# Patient Record
Sex: MALE | Race: WHITE | NOT HISPANIC OR LATINO | URBAN - METROPOLITAN AREA
[De-identification: names, ages, dates, MRNs, and addresses within clinical notes are randomized per-mention and may not be internally consistent; named-entity substitution may affect disease eponyms.]

---

## 2021-04-02 NOTE — PATIENT DISCUSSION
New Prescription: moxifloxacin (moxifloxacin): drops: 0.5% 1 drop three times a day as directed into left eye 04-

## 2021-04-02 NOTE — PATIENT DISCUSSION
Stopped Today: Visine Dry Eye Relief (peg 400-hypromellose-glycerin): drops: 1-0.2-0.2% 1 drop as needed into both eyes

## 2021-04-02 NOTE — PATIENT DISCUSSION
New Prescription: Ilevro (nepafenac): drops,suspension: 0.3% 1 drop every morning as directed into left eye 04-

## 2021-04-02 NOTE — PATIENT DISCUSSION
New Prescription: prednisolone acetate (prednisolone acetate): drops,suspension: 1% 1 drop three times a day as directed into left eye 04-

## 2021-05-05 NOTE — PATIENT DISCUSSION
Continue: moxifloxacin (moxifloxacin): drops: 0.5% 1 drop three times a day as directed into both eyes 04-

## 2021-05-05 NOTE — PATIENT DISCUSSION
Discontinue in left eye 05/12/21. Start two days prior to surgery in right eye and continue as directed.

## 2021-05-05 NOTE — PATIENT DISCUSSION
Taper as as directed by comanaging optometrist in left eye. Start immediately after surgery in right eye and continue as directed.

## 2021-05-05 NOTE — PATIENT DISCUSSION
Continue in left eye as directed by comanaging optometrist. Start two days prior to surgery in right eye and continue as directed.

## 2021-05-05 NOTE — PATIENT DISCUSSION
Continue: prednisolone acetate (prednisolone acetate): drops,suspension: 1% 1 drop three times a day as directed into both eyes 04-

## 2021-05-05 NOTE — PATIENT DISCUSSION
Continue: Ilevro (nepafenac): drops,suspension: 0.3% 1 drop every morning as directed into both eyes 04-

## 2021-05-19 NOTE — PATIENT DISCUSSION
S/P PHACO W/IOL, OD:  ELEVATED IOP. INSTILLED ONE DROP OF ALPHAGAN @ 10:32AM.  REPEAT IOP CHECK BY TECHNICIAN. ADD TRAVATAN Z QHS OD FOR 7 DAYS, TO CONTROL IOP. STOP ANTIBIOTIC EYE DROP IN 1 WEEK. CONTINUE TO TAPER OTHER TWO DROPS AS DIRECTED BY CO-MANAGING OPTOMETRIST. OKAY TO USE REFRESH PF OPTIVE PRN. FOLLOW-UP WITH CO-MANAGING OPTOMETRIST IN ONE WEEK.

## 2021-05-19 NOTE — PATIENT DISCUSSION
Continue: Ilevro (nepafenac): drops,suspension: 0.3% 1 drop every morning as directed into both eyes 05-

## 2021-05-19 NOTE — PATIENT DISCUSSION
New Prescription: Travatan Z (travoprost): drops: 0.004% 1 drop every evening as directed into right eye 05-

## 2022-09-06 ENCOUNTER — ESTABLISHED PATIENT (OUTPATIENT)
Dept: URBAN - METROPOLITAN AREA CLINIC 27 | Facility: CLINIC | Age: 72
End: 2022-09-06

## 2022-09-06 DIAGNOSIS — E10.9: ICD-10-CM

## 2022-09-06 DIAGNOSIS — H26.493: ICD-10-CM

## 2022-09-06 PROCEDURE — 92014 COMPRE OPH EXAM EST PT 1/>: CPT

## 2022-09-06 ASSESSMENT — VISUAL ACUITY
OD_CC: 20/25+1
OD_CC: J1
OS_CC: 20/25
OS_CC: J2
OD_SC: J5
OD_SC: 20/20-1
OS_SC: J7
OS_SC: 20/20

## 2022-09-06 ASSESSMENT — TONOMETRY
OS_IOP_MMHG: 13
OD_IOP_MMHG: 14

## 2022-09-14 ENCOUNTER — CONSULTATION/EVALUATION (OUTPATIENT)
Dept: URBAN - METROPOLITAN AREA CLINIC 29 | Facility: CLINIC | Age: 72
End: 2022-09-14

## 2022-09-14 DIAGNOSIS — H26.493: ICD-10-CM

## 2022-09-14 DIAGNOSIS — H40.013: ICD-10-CM

## 2022-09-14 PROCEDURE — 99204 OFFICE O/P NEW MOD 45 MIN: CPT

## 2022-09-14 ASSESSMENT — VISUAL ACUITY
OD_CC: 20/20-2
OS_SC: 20/25-2
OD_GLARE: 20/50
OS_GLARE: 20/50
OS_CC: 20/25
OD_CC: 20/20
OS_CC: 20/25-2
OD_SC: 20/20

## 2022-09-14 ASSESSMENT — TONOMETRY
OD_IOP_MMHG: 16
OS_IOP_MMHG: 16

## 2022-10-17 ENCOUNTER — SURGERY/PROCEDURE (OUTPATIENT)
Dept: URBAN - METROPOLITAN AREA SURGERY 17 | Facility: SURGERY | Age: 72
End: 2022-10-17

## 2022-10-17 DIAGNOSIS — H26.492: ICD-10-CM

## 2022-10-17 PROCEDURE — 66821 AFTER CATARACT LASER SURGERY: CPT

## 2022-10-24 ENCOUNTER — SURGERY/PROCEDURE (OUTPATIENT)
Dept: URBAN - METROPOLITAN AREA SURGERY 17 | Facility: SURGERY | Age: 72
End: 2022-10-24

## 2022-10-24 DIAGNOSIS — H26.491: ICD-10-CM

## 2022-10-24 PROCEDURE — 66821 AFTER CATARACT LASER SURGERY: CPT

## 2022-10-31 ENCOUNTER — POST-OP (OUTPATIENT)
Dept: URBAN - METROPOLITAN AREA CLINIC 27 | Facility: CLINIC | Age: 72
End: 2022-10-31

## 2022-10-31 DIAGNOSIS — H26.491: ICD-10-CM

## 2022-10-31 PROCEDURE — 99024 POSTOP FOLLOW-UP VISIT: CPT

## 2022-10-31 ASSESSMENT — VISUAL ACUITY
OS_CC: J1
OD_CC: J1
OS_CC: 20/20-1
OD_CC: 20/20

## 2022-10-31 ASSESSMENT — TONOMETRY
OD_IOP_MMHG: 17
OS_IOP_MMHG: 15

## 2023-01-31 ENCOUNTER — ESTABLISHED PATIENT (OUTPATIENT)
Dept: URBAN - METROPOLITAN AREA CLINIC 27 | Facility: CLINIC | Age: 73
End: 2023-01-31

## 2023-01-31 DIAGNOSIS — Z98.890: ICD-10-CM

## 2023-01-31 DIAGNOSIS — H26.491: ICD-10-CM

## 2023-01-31 DIAGNOSIS — H40.011: ICD-10-CM

## 2023-01-31 PROCEDURE — 92083 EXTENDED VISUAL FIELD XM: CPT

## 2023-01-31 PROCEDURE — 76514 ECHO EXAM OF EYE THICKNESS: CPT

## 2023-01-31 PROCEDURE — 92012 INTRM OPH EXAM EST PATIENT: CPT

## 2023-01-31 PROCEDURE — 92133 CPTRZD OPH DX IMG PST SGM ON: CPT

## 2023-01-31 ASSESSMENT — TONOMETRY
OS_IOP_MMHG: 15
OD_IOP_MMHG: 17

## 2023-01-31 ASSESSMENT — VISUAL ACUITY
OS_SC: 20/20
OD_SC: 20/20

## 2023-01-31 ASSESSMENT — PACHYMETRY
OD_CT_UM: 528
OS_CT_UM: 520

## 2023-05-04 ENCOUNTER — EMERGENCY VISIT (OUTPATIENT)
Dept: URBAN - METROPOLITAN AREA CLINIC 27 | Facility: CLINIC | Age: 73
End: 2023-05-04

## 2023-05-04 DIAGNOSIS — Z98.890: ICD-10-CM

## 2023-05-04 DIAGNOSIS — S05.01XA: ICD-10-CM

## 2023-05-04 DIAGNOSIS — H26.491: ICD-10-CM

## 2023-05-04 DIAGNOSIS — H40.011: ICD-10-CM

## 2023-05-04 PROCEDURE — 99213 OFFICE O/P EST LOW 20 MIN: CPT

## 2023-05-04 RX ORDER — OFLOXACIN 3 MG/ML: 1 SOLUTION OPHTHALMIC

## 2023-05-04 ASSESSMENT — TONOMETRY
OS_IOP_MMHG: 16
OD_IOP_MMHG: 16

## 2023-05-04 ASSESSMENT — VISUAL ACUITY
OD_SC: 20/20
OS_SC: 20/25-1

## 2023-05-08 ENCOUNTER — FOLLOW UP (OUTPATIENT)
Dept: URBAN - METROPOLITAN AREA CLINIC 27 | Facility: CLINIC | Age: 73
End: 2023-05-08

## 2023-05-08 DIAGNOSIS — S05.01XD: ICD-10-CM

## 2023-05-08 PROCEDURE — 99213 OFFICE O/P EST LOW 20 MIN: CPT

## 2023-05-08 ASSESSMENT — TONOMETRY
OS_IOP_MMHG: 16
OD_IOP_MMHG: 18

## 2023-05-08 ASSESSMENT — VISUAL ACUITY
OS_CC: 20/20-2
OD_CC: 20/20

## 2023-07-31 ENCOUNTER — COMPREHENSIVE EXAM (OUTPATIENT)
Dept: URBAN - METROPOLITAN AREA CLINIC 27 | Facility: CLINIC | Age: 73
End: 2023-07-31

## 2023-07-31 DIAGNOSIS — H52.03: ICD-10-CM

## 2023-07-31 DIAGNOSIS — H40.011: ICD-10-CM

## 2023-07-31 DIAGNOSIS — E10.9: ICD-10-CM

## 2023-07-31 DIAGNOSIS — H04.123: ICD-10-CM

## 2023-07-31 PROCEDURE — 92015 DETERMINE REFRACTIVE STATE: CPT

## 2023-07-31 PROCEDURE — 92014 COMPRE OPH EXAM EST PT 1/>: CPT

## 2023-07-31 PROCEDURE — 92250 FUNDUS PHOTOGRAPHY W/I&R: CPT

## 2023-07-31 ASSESSMENT — TONOMETRY
OS_IOP_MMHG: 14
OD_IOP_MMHG: 16

## 2023-07-31 ASSESSMENT — VISUAL ACUITY
OS_CC: 20/25-2
OD_CC: J1+-3
OD_CC: 20/20
OS_CC: J1+ -2

## 2023-07-31 ASSESSMENT — KERATOMETRY
OD_K1POWER_DIOPTERS: 44.25
OD_AXISANGLE_DEGREES: 35
OS_K1POWER_DIOPTERS: 44.75
OS_AXISANGLE_DEGREES: 170
OS_AXISANGLE2_DEGREES: 80
OD_K2POWER_DIOPTERS: 43.75
OD_AXISANGLE2_DEGREES: 125
OS_K2POWER_DIOPTERS: 43.50

## 2024-05-31 ASSESSMENT — KERATOMETRY
OD_K2POWER_DIOPTERS: 43.75
OS_K1POWER_DIOPTERS: 44.75
OD_AXISANGLE_DEGREES: 35
OD_AXISANGLE2_DEGREES: 125
OS_K2POWER_DIOPTERS: 43.50
OS_AXISANGLE2_DEGREES: 80
OD_K1POWER_DIOPTERS: 44.25
OS_AXISANGLE_DEGREES: 170

## 2024-06-04 ENCOUNTER — COMPREHENSIVE EXAM (OUTPATIENT)
Dept: URBAN - METROPOLITAN AREA CLINIC 27 | Facility: CLINIC | Age: 74
End: 2024-06-04

## 2024-06-04 DIAGNOSIS — H04.123: ICD-10-CM

## 2024-06-04 DIAGNOSIS — H52.223: ICD-10-CM

## 2024-06-04 DIAGNOSIS — E10.9: ICD-10-CM

## 2024-06-04 DIAGNOSIS — Z96.1: ICD-10-CM

## 2024-06-04 DIAGNOSIS — H40.011: ICD-10-CM

## 2024-06-04 PROCEDURE — 99214 OFFICE O/P EST MOD 30 MIN: CPT

## 2024-06-04 PROCEDURE — 92015 DETERMINE REFRACTIVE STATE: CPT

## 2024-06-04 PROCEDURE — 92250 FUNDUS PHOTOGRAPHY W/I&R: CPT

## 2024-06-04 ASSESSMENT — VISUAL ACUITY
OU_CC: J1+
OS_CC: 20/25-1
OD_CC: 20/20

## 2024-06-04 ASSESSMENT — TONOMETRY
OS_IOP_MMHG: 15
OD_IOP_MMHG: 16